# Patient Record
Sex: MALE | Race: BLACK OR AFRICAN AMERICAN | NOT HISPANIC OR LATINO | Employment: FULL TIME | URBAN - METROPOLITAN AREA
[De-identification: names, ages, dates, MRNs, and addresses within clinical notes are randomized per-mention and may not be internally consistent; named-entity substitution may affect disease eponyms.]

---

## 2023-10-12 ENCOUNTER — APPOINTMENT (OUTPATIENT)
Dept: PRIMARY CARE | Facility: HOSPITAL | Age: 53
End: 2023-10-12
Payer: COMMERCIAL

## 2023-10-26 PROBLEM — I10 HYPERTENSION: Status: ACTIVE | Noted: 2023-10-26

## 2023-10-26 PROBLEM — S39.012A LUMBAR STRAIN: Status: ACTIVE | Noted: 2023-10-26

## 2023-10-26 PROBLEM — K21.9 GERD (GASTROESOPHAGEAL REFLUX DISEASE): Status: ACTIVE | Noted: 2023-10-26

## 2023-10-26 PROBLEM — G43.909 MIGRAINE: Status: ACTIVE | Noted: 2023-10-26

## 2023-10-26 PROBLEM — E66.812 CLASS 2 SEVERE OBESITY DUE TO EXCESS CALORIES WITH SERIOUS COMORBIDITY AND BODY MASS INDEX (BMI) OF 35.0 TO 35.9 IN ADULT: Status: ACTIVE | Noted: 2023-10-26

## 2023-10-26 PROBLEM — H93.12 TINNITUS, LEFT EAR: Status: ACTIVE | Noted: 2023-10-26

## 2023-10-26 PROBLEM — E11.9 DIABETES MELLITUS (MULTI): Status: ACTIVE | Noted: 2023-10-26

## 2023-10-26 PROBLEM — E66.01 CLASS 2 SEVERE OBESITY DUE TO EXCESS CALORIES WITH SERIOUS COMORBIDITY AND BODY MASS INDEX (BMI) OF 35.0 TO 35.9 IN ADULT (MULTI): Status: ACTIVE | Noted: 2023-10-26

## 2023-10-26 RX ORDER — PANTOPRAZOLE SODIUM 40 MG/1
1 TABLET, DELAYED RELEASE ORAL DAILY
COMMUNITY
Start: 2021-12-08

## 2023-10-26 RX ORDER — SENNOSIDES/DOCUSATE SODIUM 8.6MG-50MG
TABLET ORAL
COMMUNITY
Start: 2021-12-08

## 2023-10-26 RX ORDER — BLOOD SUGAR DIAGNOSTIC
STRIP MISCELLANEOUS 2 TIMES DAILY
COMMUNITY
Start: 2021-02-01

## 2023-10-26 RX ORDER — SIMVASTATIN 40 MG/1
1 TABLET, FILM COATED ORAL NIGHTLY
COMMUNITY
Start: 2021-12-08

## 2023-10-26 RX ORDER — METFORMIN HYDROCHLORIDE 1000 MG/1
1 TABLET ORAL 2 TIMES DAILY
COMMUNITY
Start: 2017-07-25 | End: 2024-05-28

## 2023-10-26 RX ORDER — NAPROXEN 375 MG/1
TABLET ORAL
COMMUNITY
Start: 2015-02-03

## 2023-10-26 RX ORDER — FAMOTIDINE 20 MG/1
20 TABLET, FILM COATED ORAL DAILY PRN
COMMUNITY

## 2023-10-26 RX ORDER — GLIPIZIDE 10 MG/1
1 TABLET, FILM COATED, EXTENDED RELEASE ORAL 2 TIMES DAILY
COMMUNITY
Start: 2017-09-07

## 2023-10-26 RX ORDER — LISINOPRIL AND HYDROCHLOROTHIAZIDE 20; 25 MG/1; MG/1
1 TABLET ORAL DAILY
COMMUNITY
Start: 2018-07-26 | End: 2024-05-28

## 2023-10-26 RX ORDER — ISOPROPYL ALCOHOL 70 ML/100ML
SWAB TOPICAL
COMMUNITY

## 2023-10-26 RX ORDER — ASPIRIN 81 MG/1
1 TABLET ORAL DAILY
COMMUNITY
Start: 2017-09-07

## 2023-10-26 RX ORDER — SUMATRIPTAN 50 MG/1
50 TABLET, FILM COATED ORAL ONCE AS NEEDED
COMMUNITY
Start: 2018-08-10 | End: 2024-04-17

## 2023-10-26 RX ORDER — LANCETS
EACH MISCELLANEOUS
COMMUNITY
Start: 2021-12-08

## 2023-10-26 RX ORDER — ATORVASTATIN CALCIUM 80 MG/1
1 TABLET, FILM COATED ORAL DAILY
COMMUNITY

## 2024-04-18 DIAGNOSIS — I10 ESSENTIAL (PRIMARY) HYPERTENSION: ICD-10-CM

## 2024-04-18 DIAGNOSIS — E11.9 TYPE 2 DIABETES MELLITUS WITHOUT COMPLICATIONS (MULTI): ICD-10-CM

## 2024-04-26 ENCOUNTER — APPOINTMENT (OUTPATIENT)
Dept: PRIMARY CARE | Facility: HOSPITAL | Age: 54
End: 2024-04-26
Payer: COMMERCIAL

## 2024-05-17 ENCOUNTER — APPOINTMENT (OUTPATIENT)
Dept: PRIMARY CARE | Facility: HOSPITAL | Age: 54
End: 2024-05-17
Payer: COMMERCIAL

## 2024-05-28 RX ORDER — LISINOPRIL AND HYDROCHLOROTHIAZIDE 20; 25 MG/1; MG/1
1 TABLET ORAL DAILY
Qty: 30 TABLET | Refills: 3 | Status: SHIPPED | OUTPATIENT
Start: 2024-05-28

## 2024-05-28 RX ORDER — METFORMIN HYDROCHLORIDE 1000 MG/1
1000 TABLET ORAL 2 TIMES DAILY
Qty: 60 TABLET | Refills: 3 | Status: SHIPPED | OUTPATIENT
Start: 2024-05-28

## 2024-05-29 ENCOUNTER — DOCUMENTATION (OUTPATIENT)
Dept: INTERNAL MEDICINE | Facility: HOSPITAL | Age: 54
End: 2024-05-29
Payer: COMMERCIAL

## 2024-05-29 NOTE — PROGRESS NOTES
I spoke to the patient on 5/28/2024 regarding his prescriptions. He stated that he doesn't have metformin or lisinopril-hydrochlorothiazide anymore. He has not been seen in the clinic for several months. 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 Provided support for the patient and informed him that he should be seen in the clinic. Refilled his medications, but they were not e-transmitted. Called the patient's pharmacy to refill.     He will need to schedule an appointment to be seen.

## 2024-06-04 ENCOUNTER — APPOINTMENT (OUTPATIENT)
Dept: OPHTHALMOLOGY | Facility: CLINIC | Age: 54
End: 2024-06-04
Payer: COMMERCIAL

## 2024-07-27 DIAGNOSIS — G43.909 MIGRAINE, UNSPECIFIED, NOT INTRACTABLE, WITHOUT STATUS MIGRAINOSUS: ICD-10-CM

## 2024-07-27 RX ORDER — SUMATRIPTAN 50 MG/1
50 TABLET, FILM COATED ORAL ONCE AS NEEDED
Qty: 9 TABLET | Refills: 0 | Status: SHIPPED | OUTPATIENT
Start: 2024-07-27

## 2024-07-27 NOTE — PROGRESS NOTES
Received a message that patient is requesting a refill on sumatriptan. Will fill once, though patient has not been seen in over a year and will need appointment for future refills.

## 2024-08-15 ENCOUNTER — APPOINTMENT (OUTPATIENT)
Dept: OPHTHALMOLOGY | Facility: CLINIC | Age: 54
End: 2024-08-15
Payer: COMMERCIAL

## 2024-08-18 DIAGNOSIS — G43.909 MIGRAINE, UNSPECIFIED, NOT INTRACTABLE, WITHOUT STATUS MIGRAINOSUS: ICD-10-CM

## 2024-08-19 RX ORDER — SUMATRIPTAN 50 MG/1
50 TABLET, FILM COATED ORAL ONCE AS NEEDED
Qty: 9 TABLET | Refills: 0 | Status: SHIPPED | OUTPATIENT
Start: 2024-08-19

## 2024-11-03 ENCOUNTER — HOSPITAL ENCOUNTER (EMERGENCY)
Facility: HOSPITAL | Age: 54
Discharge: HOME | End: 2024-11-04
Attending: EMERGENCY MEDICINE
Payer: COMMERCIAL

## 2024-11-03 DIAGNOSIS — K59.00 CONSTIPATION, UNSPECIFIED CONSTIPATION TYPE: Primary | ICD-10-CM

## 2024-11-03 PROCEDURE — 99283 EMERGENCY DEPT VISIT LOW MDM: CPT

## 2024-11-03 ASSESSMENT — PAIN SCALES - GENERAL: PAINLEVEL_OUTOF10: 0 - NO PAIN

## 2024-11-03 ASSESSMENT — PAIN - FUNCTIONAL ASSESSMENT: PAIN_FUNCTIONAL_ASSESSMENT: 0-10

## 2024-11-04 VITALS
DIASTOLIC BLOOD PRESSURE: 96 MMHG | BODY MASS INDEX: 37.86 KG/M2 | HEART RATE: 78 BPM | SYSTOLIC BLOOD PRESSURE: 141 MMHG | RESPIRATION RATE: 20 BRPM | TEMPERATURE: 97.6 F | WEIGHT: 249 LBS | OXYGEN SATURATION: 100 %

## 2024-11-04 NOTE — ED TRIAGE NOTES
Bibpv from home for c/o constipation x1 way. Pt states he has the sensation to go but cannot pass stool and pain is felt. Pt denies start of any new medications. Pt still passing has and hypoactive bowel sounds present in upper right quadrant in triage.

## 2024-11-04 NOTE — ED PROVIDER NOTES
HPI   Chief Complaint   Patient presents with    Constipation       This is a 54-year-old male with a history of diabetes, hypertension, GERD and migraines who presents to the emergency department with constipation.  He reports he was last able to move his bowels 2 days ago.  The patient feels the urge to defecate but is unable to pass stool.  He reports rectal pain.  He denies blood in the stool.  He denies nausea and vomiting.  He denies fever.              Patient History   No past medical history on file.  No past surgical history on file.  No family history on file.  Social History     Tobacco Use    Smoking status: Not on file    Smokeless tobacco: Not on file   Substance Use Topics    Alcohol use: Not on file    Drug use: Not on file       Physical Exam   ED Triage Vitals   Temperature Heart Rate Respirations BP   11/03/24 2304 11/03/24 2308 11/03/24 2304 11/03/24 2304   36.4 °C (97.6 °F) 78 20 (!) 141/96      Pulse Ox Temp Source Heart Rate Source Patient Position   11/03/24 2304 11/03/24 2304 -- 11/03/24 2304   100 % Temporal  Sitting      BP Location FiO2 (%)     11/03/24 2304 --     Left arm        Physical Exam  Vitals and nursing note reviewed.   HENT:      Head: Normocephalic and atraumatic.      Nose: Nose normal.   Eyes:      Conjunctiva/sclera: Conjunctivae normal.   Cardiovascular:      Rate and Rhythm: Normal rate and regular rhythm.      Pulses: Normal pulses.      Heart sounds: Normal heart sounds.   Pulmonary:      Effort: Pulmonary effort is normal.      Breath sounds: Normal breath sounds.   Abdominal:      General: Bowel sounds are normal.      Palpations: Abdomen is soft.   Genitourinary:     Comments: Fecal impaction with light brown-colored stool.  Musculoskeletal:         General: Normal range of motion.      Cervical back: Normal range of motion and neck supple.   Skin:     Findings: No rash.   Neurological:      General: No focal deficit present.      Mental Status: He is alert and  oriented to person, place, and time.   Psychiatric:         Mood and Affect: Mood normal.           ED Course & MDM   Diagnoses as of 11/04/24 0103   Constipation, unspecified constipation type                 No data recorded     [place] Coma Scale Score: 15 (11/03/24 2317 : Chioma Thomas RN)                           Medical Decision Making  Differential diagnosis considered: Constipation, bowel obstruction, rectal abscess    This is a 54-year-old male who presents to the emergency department constipation.  On rectal exam, he has a large amount of stool in the vault.  Will attempt a soapsuds enema.  Patient had relief with a soapsuds enema.  He was instructed to use MiraLAX at home.  He was appropriate for discharge.        Procedure  Procedures     Lamberto Amado MD  11/04/24 0104

## 2024-11-05 ENCOUNTER — PATIENT OUTREACH (OUTPATIENT)
Dept: CARE COORDINATION | Facility: CLINIC | Age: 54
End: 2024-11-05
Payer: COMMERCIAL

## 2024-11-10 DIAGNOSIS — I10 ESSENTIAL (PRIMARY) HYPERTENSION: ICD-10-CM

## 2024-11-10 DIAGNOSIS — E11.9 TYPE 2 DIABETES MELLITUS WITHOUT COMPLICATIONS (MULTI): ICD-10-CM

## 2024-12-26 ENCOUNTER — APPOINTMENT (OUTPATIENT)
Dept: OPHTHALMOLOGY | Facility: CLINIC | Age: 54
End: 2024-12-26
Payer: COMMERCIAL

## 2025-01-10 ENCOUNTER — APPOINTMENT (OUTPATIENT)
Facility: HOSPITAL | Age: 55
End: 2025-01-10
Payer: COMMERCIAL

## 2025-01-14 ENCOUNTER — APPOINTMENT (OUTPATIENT)
Dept: PRIMARY CARE | Facility: HOSPITAL | Age: 55
End: 2025-01-14
Payer: COMMERCIAL

## 2025-02-17 ENCOUNTER — TELEPHONE (OUTPATIENT)
Facility: HOSPITAL | Age: 55
End: 2025-02-17
Payer: COMMERCIAL

## 2025-02-17 NOTE — TELEPHONE ENCOUNTER
Copied from CRM #[id number]. Topic: Information Request - Prescription Refill FAQ  >> Feb 14, 2025  2:24 PM Roly COOPER wrote:  Patient says he is out of medication and would like them refilled if possible.

## 2025-03-06 ENCOUNTER — APPOINTMENT (OUTPATIENT)
Dept: OPHTHALMOLOGY | Facility: CLINIC | Age: 55
End: 2025-03-06
Payer: COMMERCIAL

## 2025-03-20 ENCOUNTER — OFFICE VISIT (OUTPATIENT)
Dept: PRIMARY CARE | Facility: HOSPITAL | Age: 55
End: 2025-03-20
Payer: COMMERCIAL

## 2025-03-20 VITALS
TEMPERATURE: 97.5 F | HEART RATE: 88 BPM | WEIGHT: 253.3 LBS | HEIGHT: 68 IN | BODY MASS INDEX: 38.39 KG/M2 | OXYGEN SATURATION: 100 %

## 2025-03-20 DIAGNOSIS — I10 ESSENTIAL (PRIMARY) HYPERTENSION: ICD-10-CM

## 2025-03-20 DIAGNOSIS — K59.00 CONSTIPATION, UNSPECIFIED CONSTIPATION TYPE: ICD-10-CM

## 2025-03-20 DIAGNOSIS — G89.29 CHRONIC LEFT-SIDED LOW BACK PAIN WITH LEFT-SIDED SCIATICA: Primary | ICD-10-CM

## 2025-03-20 DIAGNOSIS — Z80.0 ENCOUNTER FOR COLONOSCOPY IN PATIENT WITH FAMILY HISTORY OF COLON CANCER: ICD-10-CM

## 2025-03-20 DIAGNOSIS — Z12.11 ENCOUNTER FOR COLONOSCOPY IN PATIENT WITH FAMILY HISTORY OF COLON CANCER: ICD-10-CM

## 2025-03-20 DIAGNOSIS — K21.9 GASTROESOPHAGEAL REFLUX DISEASE WITHOUT ESOPHAGITIS: ICD-10-CM

## 2025-03-20 DIAGNOSIS — E11.9 TYPE 2 DIABETES MELLITUS WITHOUT COMPLICATIONS (MULTI): ICD-10-CM

## 2025-03-20 DIAGNOSIS — N50.89 GENITAL LESION, MALE: ICD-10-CM

## 2025-03-20 DIAGNOSIS — M54.42 CHRONIC LEFT-SIDED LOW BACK PAIN WITH LEFT-SIDED SCIATICA: Primary | ICD-10-CM

## 2025-03-20 DIAGNOSIS — G43.909 MIGRAINE, UNSPECIFIED, NOT INTRACTABLE, WITHOUT STATUS MIGRAINOSUS: ICD-10-CM

## 2025-03-20 DIAGNOSIS — E11.9 TYPE 2 DIABETES MELLITUS WITHOUT COMPLICATION, UNSPECIFIED WHETHER LONG TERM INSULIN USE (MULTI): ICD-10-CM

## 2025-03-20 LAB — GLUCOSE BLD MANUAL STRIP-MCNC: 287 MG/DL (ref 74–99)

## 2025-03-20 PROCEDURE — 3008F BODY MASS INDEX DOCD: CPT

## 2025-03-20 PROCEDURE — 36416 COLLJ CAPILLARY BLOOD SPEC: CPT | Mod: GC

## 2025-03-20 PROCEDURE — 99214 OFFICE O/P EST MOD 30 MIN: CPT | Mod: GC

## 2025-03-20 PROCEDURE — 82947 ASSAY GLUCOSE BLOOD QUANT: CPT

## 2025-03-20 PROCEDURE — 99214 OFFICE O/P EST MOD 30 MIN: CPT

## 2025-03-20 RX ORDER — POLYETHYLENE GLYCOL 3350 17 G/17G
17 POWDER, FOR SOLUTION ORAL DAILY
Qty: 3 PACKET | Refills: 0 | Status: SHIPPED | OUTPATIENT
Start: 2025-03-20 | End: 2025-03-23

## 2025-03-20 RX ORDER — LISINOPRIL AND HYDROCHLOROTHIAZIDE 20; 25 MG/1; MG/1
1 TABLET ORAL DAILY
Qty: 30 TABLET | Refills: 3 | Status: SHIPPED | OUTPATIENT
Start: 2025-03-20

## 2025-03-20 RX ORDER — SUMATRIPTAN SUCCINATE 50 MG/1
50 TABLET ORAL ONCE AS NEEDED
Qty: 9 TABLET | Refills: 0 | Status: SHIPPED | OUTPATIENT
Start: 2025-03-20

## 2025-03-20 RX ORDER — LISINOPRIL AND HYDROCHLOROTHIAZIDE 20; 25 MG/1; MG/1
1 TABLET ORAL DAILY
Qty: 30 TABLET | Refills: 3 | OUTPATIENT
Start: 2025-03-20

## 2025-03-20 RX ORDER — SIMVASTATIN 40 MG/1
40 TABLET, FILM COATED ORAL NIGHTLY
Qty: 30 TABLET | Refills: 11 | Status: SHIPPED | OUTPATIENT
Start: 2025-03-20 | End: 2026-03-20

## 2025-03-20 RX ORDER — GABAPENTIN 300 MG/1
300 CAPSULE ORAL 3 TIMES DAILY
Qty: 90 CAPSULE | Refills: 11 | Status: SHIPPED | OUTPATIENT
Start: 2025-03-20 | End: 2026-03-20

## 2025-03-20 RX ORDER — METFORMIN HYDROCHLORIDE 1000 MG/1
1000 TABLET ORAL 2 TIMES DAILY
Qty: 60 TABLET | Refills: 3 | OUTPATIENT
Start: 2025-03-20

## 2025-03-20 RX ORDER — METFORMIN HYDROCHLORIDE 1000 MG/1
1000 TABLET ORAL 2 TIMES DAILY
Qty: 60 TABLET | Refills: 3 | Status: SHIPPED | OUTPATIENT
Start: 2025-03-20

## 2025-03-20 RX ORDER — PANTOPRAZOLE SODIUM 40 MG/1
40 TABLET, DELAYED RELEASE ORAL DAILY
Qty: 60 TABLET | Refills: 1 | Status: SHIPPED | OUTPATIENT
Start: 2025-03-20 | End: 2025-07-18

## 2025-03-20 RX ORDER — GLIPIZIDE 10 MG/1
10 TABLET, FILM COATED, EXTENDED RELEASE ORAL 2 TIMES DAILY
Qty: 60 TABLET | Refills: 1 | Status: SHIPPED | OUTPATIENT
Start: 2025-03-20 | End: 2025-05-19

## 2025-03-20 ASSESSMENT — PATIENT HEALTH QUESTIONNAIRE - PHQ9
2. FEELING DOWN, DEPRESSED OR HOPELESS: NOT AT ALL
1. LITTLE INTEREST OR PLEASURE IN DOING THINGS: NOT AT ALL
SUM OF ALL RESPONSES TO PHQ9 QUESTIONS 1 AND 2: 0

## 2025-03-20 ASSESSMENT — ENCOUNTER SYMPTOMS
LOSS OF SENSATION IN FEET: 1
OCCASIONAL FEELINGS OF UNSTEADINESS: 1
DEPRESSION: 0

## 2025-03-20 ASSESSMENT — PAIN SCALES - GENERAL: PAINLEVEL_OUTOF10: 7

## 2025-03-20 NOTE — PROGRESS NOTES
Chief complaint:    HPI:  Matt Toledo is a 54 y.o. male with PMHx poorly controlled T2DM (Hgb A1C 9.8% 2021), severe obesity (BMI 38), HLD, GERD, and migraines that presents to [facility] for followup. Last office visit in 3/2023. Did not complete outpatient lab orders or colonoscopy. Ran out of DM and BP meds for several months. Does not check BP at home (machine is apparently malfunctioning). He is interested in dietary changes. Agreeable to screening colonoscopy. Low back pain with neuropathic L leg pain. Reports constipation for past 2-3 days. He was in the ED for it recently. States he may have a fungal infection on/around his genitals. Mentions he is in a hurry to go  his son. Patient left the clinic before a genital exam could be perform. He left before he received his AVS.     ROS otherwise negative.      Medications:  Current Outpatient Medications   Medication Instructions    alcohol swabs (Alcohol Pads) pads, medicated topical (top), 70% - use to clean testing and / or injection site twice a day    aspirin 81 mg EC tablet 1 tablet, oral, Daily    atorvastatin (Lipitor) 80 mg tablet 1 tablet, oral, Daily    blood sugar diagnostic (Advanced Gluc Meter Test Strip) strip test your blood sugar twice per day    blood sugar diagnostic (ONE TOUCH TEST MISC) USE AS DIRECTED    famotidine (PEPCID) 20 mg, oral, Daily PRN    glipiZIDE XL (Glucotrol XL) 10 mg 24 hr tablet 1 tablet, oral, 2 times daily    lancets (Lancets,Thin) misc USE AS DIRECTED    lisinopriL-hydrochlorothiazide 20-25 mg tablet 1 tablet, oral, Daily    metFORMIN (GLUCOPHAGE) 1,000 mg, oral, 2 times daily    naproxen (Naprosyn) 375 mg tablet oral, 1 tablet 2 x a day with food x5 days then 1 tablet twice a day as needed    ONETOUCH DELICA LANCETS MISC USE TO TEST TWICE A DAY    OneTouch Ultra Test strip 2 times daily    pantoprazole (ProtoNix) 40 mg EC tablet 1 tablet, oral, Daily    simvastatin (Zocor) 40 mg tablet 1 tablet, oral, Nightly     SUMAtriptan (IMITREX) 50 mg, oral, Once as needed, May repeat dose once in 2 hours if no relief.  Do not exceed 2 doses in 24 hours.       Allergies:  Allergies   Allergen Reactions    Codeine Unknown    Topiramate Palpitations       Past medical history:  History reviewed. No pertinent past medical history.    Surgical history:  History reviewed. No pertinent surgical history.    Family history:  Mother w/ CRC.    Social history:   Denies tobacco use.   Rare alcohol use.  Denies drug use.    Health maintenance:  Health Maintenance   Topic Date Due    Yearly Adult Physical  Never done    HIV Screening  Never done    Colorectal Cancer Screening  Never done    MMR Vaccines (1 of 1 - Standard series) Never done    Diabetes: Retinopathy Screening  Never done    Hepatitis C Screening  Never done    Hepatitis B Vaccines (1 of 3 - 19+ 3-dose series) Never done    Pneumococcal Vaccine (1 of 2 - PCV) Never done    Diabetes: Hemoglobin A1C  07/13/2021    Zoster Vaccines (1 of 2) Never done    Lipid Panel  02/01/2022    Diabetes: Urine Protein Screening  02/01/2022    Influenza Vaccine (1) Never done    COVID-19 Vaccine (3 - 2024-25 season) 09/01/2024    DTaP/Tdap/Td Vaccines (2 - Td or Tdap) 02/01/2031    HIB Vaccines  Aged Out    IPV Vaccines  Aged Out    Hepatitis A Vaccines  Aged Out    Meningococcal Vaccine  Aged Out    Rotavirus Vaccines  Aged Out    HPV Vaccines  Aged Out       Vitals:  Vitals:    03/20/25 1521   Pulse: 88   Temp: 36.4 °C (97.5 °F)   SpO2: 100%       Physical exam:  General: obese, NAD  Neuro: AOx4, cooperative, moves all extremities spontaneously.   HEENT: NC/AT. MMM.   Respiratory: CTA bilaterally. Normal respiratory effort. On RA  CV: RRR. No murmurs. No JVD. Radial pulses 2+. No LE edema bilaterally.  Abdomen: Soft, protuberant, NT to palpation. +BS. No CVA tenderness.  Skin: Warm, dry.  Psych: Appropriate mood and affect.     Labs:  Lab Results   Component Value Date    WBC 6.4 04/13/2021    HGB  "13.4 (L) 04/13/2021    HCT 42.1 04/13/2021    MCV 89 04/13/2021     04/13/2021       Lab Results   Component Value Date    GLUCOSE 225 (H) 04/13/2021    CALCIUM 8.5 (L) 04/13/2021     04/13/2021    K 3.8 04/13/2021    CO2 24 04/13/2021     04/13/2021    BUN 16 04/13/2021    CREATININE 1.08 04/13/2021       Lab Results   Component Value Date    HGBA1C 9.8 04/13/2021        Lab Results   Component Value Date    CHOL 152 02/01/2021     Lab Results   Component Value Date    HDL 50.1 02/01/2021     No results found for: \"LDLCALC\"  No results found for: \"TRIG\"  No components found for: \"CHOLHDL\"    Imaging:  No results found.    Assessment and plan:  Matt Toledo is a 54 y.o. male with PMHx poorly controlled T2DM (Hgb A1C 9.8% 2021), HTN, severe obesity (BMI 38), HLD, GERD, and migraines who presents to [facility] for follow-up. Given refill for medications. Colonoscopy referral made. Routine labs ordered. Refer to dermatology for reported genital skin lesion, unable to exam as patient left in the middle of encounter to pick his son up from school.    #T2DM, uncontrolled  #Diabetic neuropathy  ::Last Hgb A1c 9.8% in 2021  ::Elevated BG ~200 in clinic today  -Repeat Hgb A1C, CBC, CMP, lipid panel, and urine albumin/Cr  -Renal protection: On lisinopril  -Opthalmology referral placed  -Podiatry: referral placed  -Continue metformin 1000 mg BID and glipizide 10 mg BID for now, will discuss changes to medication regimen based on results of Hgb A1C  -Start gabapentin 300 mg TID     #Obesity  -Discussed weight loss strategies specifically patient plans to avoid eating out, switching to carbonated water instead of pop, and increasing his intake of fruits and vegetables.  -Discuss comprehensive weight loss clinic, at next visit  -Referred to dietician  -TSH wnl 2022     #HLD  -Repeat Lipid panel  -Continue simvastatin 40 mg daily  -Unclear why patient is taking daily ASA 81 mg, address at next visit   "   #HTN  -Refilled lisinopril 20mg-HCTZ 25 mg     #GERD  -Restart pantoprazole 40 mg  -c/w famotidine 20 mg daily prn for breakthrough     #Migraines  -Continue with sumatriptan 50mg PRN (do not exceed 200mg in a 24 hour period)    #Constipation  -start metamucil daily  -miralax daily prn      #Health Maintenance  CV:  -Lipid panel and Hgb A1C ordered  -Tobacco: Denies  -EtOH: 1-2 drinks per month     Infectious Disease:  -Hep C and HIV ordered     Immunizations:  -Discussed PCV 20 and COVID boosters, unable to stay to receive today, discuss at next visit     Cancer Screening:  -Colonoscopy ordered today  -Prostate cancer: Discussed that patient would like to pursue screening, will start at age 56  -Low dose CT: Not indicated     Plan     Follow-up in 6 weeks.    Patient and plan discussed with attending physician Dr. Nelson.    Pawel Coles MD

## 2025-03-22 NOTE — PROGRESS NOTES
I reviewed the resident/fellow's documentation and discussed the patient with the resident/fellow. I agree with the resident/fellow's medical decision making as documented in the note.   Follow up in [facility].    Rakesh Nelson MD

## 2025-03-31 ENCOUNTER — PATIENT OUTREACH (OUTPATIENT)
Dept: CARE COORDINATION | Facility: CLINIC | Age: 55
End: 2025-03-31
Payer: COMMERCIAL

## 2025-03-31 NOTE — PROGRESS NOTES
3/31: ACO RD consult: Attempted outreach, No answer. Please call [phone] if interested in making appointment with Dietitian. IMANI

## 2025-04-02 ENCOUNTER — PATIENT OUTREACH (OUTPATIENT)
Dept: CARE COORDINATION | Facility: CLINIC | Age: 55
End: 2025-04-02
Payer: COMMERCIAL

## 2025-04-08 ENCOUNTER — APPOINTMENT (OUTPATIENT)
Dept: CARE COORDINATION | Facility: CLINIC | Age: 55
End: 2025-04-08
Payer: COMMERCIAL

## 2025-06-05 DIAGNOSIS — G43.909 MIGRAINE, UNSPECIFIED, NOT INTRACTABLE, WITHOUT STATUS MIGRAINOSUS: ICD-10-CM

## 2025-06-05 RX ORDER — SUMATRIPTAN SUCCINATE 50 MG/1
25 TABLET ORAL ONCE AS NEEDED
Qty: 9 TABLET | Refills: 0 | Status: SHIPPED | OUTPATIENT
Start: 2025-06-05 | End: 2025-08-04

## 2025-06-13 DIAGNOSIS — K21.9 GASTROESOPHAGEAL REFLUX DISEASE WITHOUT ESOPHAGITIS: ICD-10-CM

## 2025-06-13 DIAGNOSIS — G43.909 MIGRAINE, UNSPECIFIED, NOT INTRACTABLE, WITHOUT STATUS MIGRAINOSUS: ICD-10-CM

## 2025-06-16 RX ORDER — PANTOPRAZOLE SODIUM 40 MG/1
40 TABLET, DELAYED RELEASE ORAL DAILY
Qty: 60 TABLET | Refills: 1 | Status: SHIPPED | OUTPATIENT
Start: 2025-06-16

## 2025-06-16 RX ORDER — SUMATRIPTAN SUCCINATE 50 MG/1
50 TABLET ORAL ONCE AS NEEDED
Qty: 30 TABLET | Refills: 0 | Status: SHIPPED | OUTPATIENT
Start: 2025-06-16 | End: 2025-07-16

## 2025-08-01 DIAGNOSIS — E11.9 TYPE 2 DIABETES MELLITUS WITHOUT COMPLICATIONS: ICD-10-CM

## 2025-08-01 DIAGNOSIS — I10 ESSENTIAL (PRIMARY) HYPERTENSION: ICD-10-CM

## 2025-08-01 RX ORDER — METFORMIN HYDROCHLORIDE 1000 MG/1
1000 TABLET ORAL 2 TIMES DAILY
Qty: 60 TABLET | Refills: 0 | Status: SHIPPED | OUTPATIENT
Start: 2025-08-01

## 2025-08-01 RX ORDER — GLIPIZIDE 10 MG/1
10 TABLET, FILM COATED, EXTENDED RELEASE ORAL 2 TIMES DAILY
Qty: 60 TABLET | Refills: 0 | Status: SHIPPED | OUTPATIENT
Start: 2025-08-01

## 2025-08-01 RX ORDER — LISINOPRIL AND HYDROCHLOROTHIAZIDE 20; 25 MG/1; MG/1
1 TABLET ORAL DAILY
Qty: 30 TABLET | Refills: 0 | Status: SHIPPED | OUTPATIENT
Start: 2025-08-01